# Patient Record
Sex: MALE | Race: WHITE
[De-identification: names, ages, dates, MRNs, and addresses within clinical notes are randomized per-mention and may not be internally consistent; named-entity substitution may affect disease eponyms.]

---

## 2017-02-16 ENCOUNTER — HOSPITAL ENCOUNTER (OUTPATIENT)
Dept: HOSPITAL 47 - LABWHC1 | Age: 47
Discharge: HOME | End: 2017-02-16
Payer: COMMERCIAL

## 2017-02-16 DIAGNOSIS — A09: Primary | ICD-10-CM

## 2017-02-16 LAB
ANION GAP SERPL CALC-SCNC: 11 MMOL/L
BASOPHILS # BLD AUTO: 0 K/UL (ref 0–0.2)
BASOPHILS NFR BLD AUTO: 1 %
CH: 32.4
CHCM: 34.4
CHLORIDE SERPL-SCNC: 103 MMOL/L (ref 98–107)
CO2 SERPL-SCNC: 26 MMOL/L (ref 22–30)
EOSINOPHIL # BLD AUTO: 0.2 K/UL (ref 0–0.7)
EOSINOPHIL NFR BLD AUTO: 2 %
ERYTHROCYTE [DISTWIDTH] IN BLOOD BY AUTOMATED COUNT: 5.36 M/UL (ref 4.3–5.9)
ERYTHROCYTE [DISTWIDTH] IN BLOOD: 12.3 % (ref 11.5–15.5)
HCT VFR BLD AUTO: 50.6 % (ref 39–53)
HDW: 2.4
HGB BLD-MCNC: 17 GM/DL (ref 13–17.5)
LUC NFR BLD AUTO: 2 %
LYMPHOCYTES # SPEC AUTO: 1.9 K/UL (ref 1–4.8)
LYMPHOCYTES NFR SPEC AUTO: 31 %
MCH RBC QN AUTO: 31.8 PG (ref 25–35)
MCHC RBC AUTO-ENTMCNC: 33.7 G/DL (ref 31–37)
MCV RBC AUTO: 94.5 FL (ref 80–100)
MONOCYTES # BLD AUTO: 0.4 K/UL (ref 0–1)
MONOCYTES NFR BLD AUTO: 7 %
NEUTROPHILS # BLD AUTO: 3.6 K/UL (ref 1.3–7.7)
NEUTROPHILS NFR BLD AUTO: 57 %
POTASSIUM SERPL-SCNC: 4.6 MMOL/L (ref 3.5–5.1)
SODIUM SERPL-SCNC: 140 MMOL/L (ref 137–145)
WBC # BLD AUTO: 0.14 10*3/UL
WBC # BLD AUTO: 6.2 K/UL (ref 3.8–10.6)
WBC (PEROX): 6.21

## 2017-02-16 PROCEDURE — 36415 COLL VENOUS BLD VENIPUNCTURE: CPT

## 2017-02-16 PROCEDURE — 85025 COMPLETE CBC W/AUTO DIFF WBC: CPT

## 2017-02-16 PROCEDURE — 80051 ELECTROLYTE PANEL: CPT

## 2020-03-12 ENCOUNTER — HOSPITAL ENCOUNTER (EMERGENCY)
Dept: HOSPITAL 47 - EC | Age: 50
Discharge: HOME | End: 2020-03-12
Payer: COMMERCIAL

## 2020-03-12 VITALS
SYSTOLIC BLOOD PRESSURE: 129 MMHG | RESPIRATION RATE: 18 BRPM | TEMPERATURE: 97.9 F | DIASTOLIC BLOOD PRESSURE: 82 MMHG | HEART RATE: 82 BPM

## 2020-03-12 DIAGNOSIS — S62.522A: Primary | ICD-10-CM
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
## 2020-03-12 PROCEDURE — 99283 EMERGENCY DEPT VISIT LOW MDM: CPT

## 2020-03-12 NOTE — XR
EXAMINATION TYPE: XR finger LT

 

DATE OF EXAM: 3/12/2020

 

COMPARISON: NONE

 

HISTORY: Pain

 

TECHNIQUE: 2 views submitted

 

FINDINGS: Displaced comminuted fracture tuft distal phalanx first digit

 

IMPRESSION: Displaced comminuted fracture tuft distal phalanx first digit

## 2020-03-12 NOTE — ED
Upper Extremity HPI





- General


Chief Complaint: Extremity Injury, Upper


Stated Complaint: Thumb injury


Time Seen by Provider: 03/12/20 12:16


Source: patient, family


Mode of arrival: ambulatory





- History of Present Illness


Initial Comments: 





Patient is a 49-year-old male presenting to emergency Department with a chief 

complaint of a left thumb injury.  Patient states he hit his finger with a 

hammer.  States the incident occurred yesterday.  States there is small amounts 

of pain but he has noticed swelling.  States his tetanus is up-to-date.  Denies 

any numbness or tingling.  Does report some some bruising in the region.  Denies

taking any medication to alleviate the symptoms.  Not on blood thinners.





- Related Data


                                Home Medications











 Medication  Instructions  Recorded  Confirmed


 


HYDROcodone/APAP 7.5-325MG [Norco 1 tab PO AS DIRECTED PRN 02/15/15 03/23/15





7.5-325]   








                                  Previous Rx's











 Medication  Instructions  Recorded


 


Hydrocodone/Acetaminophen [Norco 1 each PO Q6H PRN #60 tab 03/23/15





]  











                                    Allergies











Allergy/AdvReac Type Severity Reaction Status Date / Time


 


No Known Allergies Allergy   Verified 03/12/20 12:01














Review of Systems


ROS Statement: 


Those systems with pertinent positive or pertinent negative responses have been 

documented in the HPI.





ROS Other: All systems not noted in ROS Statement are negative.





Past Medical History


Past Medical History: No Reported History


Additional Past Medical History / Comment(s): MVP


History of Any Multi-Drug Resistant Organisms: None Reported


Past Surgical History: Orthopedic Surgery


Additional Past Surgical History / Comment(s): rt heel ,jaw,


Past Anesthesia/Blood Transfusion Reactions: Postoperative Nausea & Vomiting 

(PONV)


Past Psychological History: No Psychological Hx Reported


Smoking Status: Never smoker


Past Alcohol Use History: Occasional


Past Drug Use History: None Reported





General Exam


Limitations: no limitations


General appearance: alert, in no apparent distress


Head exam: Present: atraumatic, normocephalic, normal inspection


Eye exam: Present: normal appearance


Pupils: Present: normal accommodation


ENT exam: Present: normal exam


Neck exam: Present: normal inspection, full ROM


Respiratory exam: Present: normal lung sounds bilaterally


Cardiovascular Exam: Present: regular rate, normal rhythm, normal heart sounds


Extremities exam: Present: full ROM, tenderness, normal capillary refill.  

Absent: normal inspection (Left distal thumb trauma.  No subinguinal hematoma.  

Mild bruising and tenderness in the region.)


Back exam: Present: normal inspection, full ROM


Neurological exam: Present: alert, oriented X3


Psychiatric exam: Present: normal affect, normal mood


Skin exam: Present: warm, dry, intact, normal color





Course





                                   Vital Signs











  03/12/20





  11:57


 


Temperature 97.9 F


 


Pulse Rate 82


 


Respiratory 18





Rate 


 


Blood Pressure 129/82


 


O2 Sat by Pulse 99





Oximetry 














Procedures





- Orthopedic Splinting/Casting


  ** Injury #1


Side: left


Upper Extremity Injury Location: finger (Thumb)


Upper Extremity Immobilizer: finger (other)





Medical Decision Making





- Medical Decision Making





patient is a 49-year-old male presenting to emergency Department with a chief 

complaint of thumb injury.  No numbness or tingling in the region.  Tetanus 

up-to-date.  Exam patient has slight swelling on the distal left thumb.  Mild 

ecchymosis present in the region as well.  No subinguinal hematoma.  No loss of 

sensation.  Neurovascularly intact.  X-ray shows a comminuted tuft fracture of 

the distal left first digit.  Finger splint applied.  Patient to follow-up with 

orthopedics.  Return parameters discussed.  Advised to apply ice compress an 

alternate between Tylenol and Motrin for pain control.  Case discussed with 

physician.





Disposition


Clinical Impression: 


 Injury of left thumb, Closed fracture of tuft of distal phalanx of thumb





Disposition: HOME SELF-CARE


Condition: Stable


Instructions (If sedation given, give patient instructions):  Finger Fracture 

(ED)


Additional Instructions: 


Follow with orthopedics.  Return to emergency department if symptoms worsen.  

Alternate between Tylenol and Motrin for pain control.  Apply ice compress to 

minimize symptoms.


Is patient prescribed a controlled substance at d/c from ED?: No


Referrals: 


Vivek Alston MD [Primary Care Provider] - 1-2 days


Devon Love PAC [PHYSICIAN ASSISTANT] - 1-2 days


Time of Disposition: 13:18

## 2021-08-25 ENCOUNTER — HOSPITAL ENCOUNTER (OUTPATIENT)
Dept: HOSPITAL 47 - RADXRMAIN | Age: 51
Discharge: HOME | End: 2021-08-25
Attending: FAMILY MEDICINE
Payer: COMMERCIAL

## 2021-08-25 DIAGNOSIS — S99.921A: Primary | ICD-10-CM

## 2021-08-25 DIAGNOSIS — M77.31: ICD-10-CM

## 2021-08-25 DIAGNOSIS — M85.871: ICD-10-CM

## 2021-08-25 NOTE — XR
Right foot

 

HISTORY: Trauma and pain

 

2 views of the right foot, no comparisons

 

There is some mild arthropathy change present with some joint space loss at the interphalangeal joint
 of the first digit, metatarsophalangeal joint. Bone mineralization is reduced. Postop change noted t
o the calcaneus. Alignment is maintained. No acute fracture or dislocation. There is a small plantar 
calcaneal spur.

 

IMPRESSION: Postop changes. Osteopenia. There is a plantar calcaneal spur, mild arthropathy noted at 
the first digit.